# Patient Record
(demographics unavailable — no encounter records)

---

## 2020-04-14 NOTE — REP
RIGHT HAND, FOUR VIEWS:

 

Four views of the right hand were performed.

 

No acute fracture or dislocation is seen.  There appears to be an old healed

fracture of the 5th proximal phalanx.  No radiopaque foreign body is seen in the

soft tissues.

 

 

Electronically Signed by

Stephen Lin MD 04/14/2020 03:34 P